# Patient Record
Sex: MALE | URBAN - METROPOLITAN AREA
[De-identification: names, ages, dates, MRNs, and addresses within clinical notes are randomized per-mention and may not be internally consistent; named-entity substitution may affect disease eponyms.]

---

## 2021-12-14 ENCOUNTER — HOSPITAL ENCOUNTER (OUTPATIENT)
Dept: OCCUPATIONAL MEDICINE | Age: 36
Discharge: HOME | End: 2021-12-14

## 2021-12-14 ENCOUNTER — TRANSCRIBE ORDER (OUTPATIENT)
Dept: OCCUPATIONAL MEDICINE | Age: 36
End: 2021-12-14

## 2021-12-14 DIAGNOSIS — Z00.8 HEALTH EXAMINATION IN POPULATION SURVEY: Primary | ICD-10-CM

## 2021-12-14 DIAGNOSIS — Z00.8 HEALTH EXAMINATION IN POPULATION SURVEY: ICD-10-CM

## 2022-06-16 ENCOUNTER — TELEPHONE (OUTPATIENT)
Dept: PULMONOLOGY | Age: 37
End: 2022-06-16

## 2022-06-16 DIAGNOSIS — J98.4 RESTRICTIVE LUNG DISEASE: Primary | ICD-10-CM

## 2022-06-16 NOTE — TELEPHONE ENCOUNTER
Called pt to remind them of their CT scan. Pt's wife answered gave her the number to radiology.  She stated she will call them and get an appointment for CT  before his office visit on 06/22/2022

## 2022-06-17 DIAGNOSIS — J98.4 RESTRICTIVE LUNG DISEASE: Primary | ICD-10-CM

## 2022-06-20 ENCOUNTER — TELEPHONE (OUTPATIENT)
Dept: PULMONOLOGY | Age: 37
End: 2022-06-20

## 2022-06-20 NOTE — TELEPHONE ENCOUNTER
Called patient to remind him he needs to get CT scan before Wednesday. Patient's wife said pt was at work and would like to   R/s for a Tuesday or Wednesday only. Patient  Is  r/s for  Wed. 07/27/2022 at 1030 am with Petey Zaragoza NP. Let her know he needs to be here 20 min before appt. Reminded her again pt has must have CT before appt. She voiced understanding. NO further Questions.

## 2022-06-28 ENCOUNTER — HOSPITAL ENCOUNTER (OUTPATIENT)
Dept: CT IMAGING | Age: 37
Discharge: HOME | End: 2022-06-30
Payer: COMMERCIAL

## 2022-06-28 DIAGNOSIS — J98.4 RESTRICTIVE LUNG DISEASE: ICD-10-CM

## 2022-06-28 PROCEDURE — 71250 CT THORAX DX C-: CPT

## 2022-07-26 NOTE — PROGRESS NOTES
[address], [facility]  [phone]        Name:  Nirmal Dixon  YOB: 1985  MRN:  [id number]    Office visit  7/27/2022      Chief Complaint   Patient presents with    New Patient     Restrictive lung disease, ILD           HISTORY OF PRESENT ILLNESS:  The patient is a 40year old male who [facility] for evaluation, treatment, and management of restrictive lung disease. He has a history of hyperlipidemia, hyperglycemia, asthma, allergic rhinitis, ANDRES, scleroderma, and chronic back pain. He is accompanied by his wife who gives much of the history. Records from [place] are reviewed and summarized--had CT of chest 4/26/21 which revealed 5 mm RUL nodule, 4 mm RLL nodule, and 2 mm RLL nodule--stable since 7/2019 and bilateral reticular opacities throughout all lobes. CPFTs done 6/16/2021 revealed severe restriction. Had HST via [facility] 6/18/2019 which revealed AHI of 74.1 and O2 sat of 67.2. Had allergy testing 7/2019 with sensitivities to ragweed, tree pollen, corn, and apples. Was under the care of a pulmonologist while living in [place], [place]. Moved here about a year ago. Initially saw a pulmonologist due to cough. Was started on Spiriva and Tessalon and cough improved. Due to persistent cough and Raynaud's phenomenon led to further work up and was diagnosed with scleroderma and ILD. Has been on Imuran 50 mg bid x 3 years. Was seeing a rheumatologist in [place], but hasn't seen anyone here yet. Cough has worsened since being out of Spiriva. Was diagnosed with asthma as a teenager which seems to be allergy driven and currently is on no therapy. Denies any wheezing. Has significant reflux and is not on any therapy. Has DHS, fatigue, snoring, and witnessed apneas per the wife. Tried CPAP for about a month in [place], but turned in back it because he couldn't tolerate the \"cold air blowing\".   He is willing Status:   Future     Number of Occurrences:   1     Standing Expiration Date:   7/27/2023    CBC with Auto Differential     Standing Status:   Future     Number of Occurrences:   1     Standing Expiration Date:   7/27/2023    Ambulatory referral to Cardiology     Referral Priority:   Routine     Referral Type:   Consult for Advice and Opinion     Referral Reason:   Specialty Services Required     Number of Visits Requested:   1    Ambulatory Referral to Sleep Studies     Referral Priority:   Routine     Referral Type:   Consult for Advice and Opinion     Referral Reason:   Specialty Services Required     Requested Specialty:   Sleep Center     Number of Visits Requested:   1    [facility] [address] Rheumatology     Referral Priority:   Routine     Referral Type:   Eval and Treat     Referral Reason:   Specialty Services Required     Requested Specialty:   Rheumatology     Number of Visits Requested:   1    Spirometry Without Bronchodilator      Orders Placed This Encounter   Medications    benzonatate (TESSALON) 100 MG capsule     Sig: Take 1-2 capsules by mouth 3 times daily as needed for Cough     Dispense:  100 capsule     Refill:  3    omeprazole (PRILOSEC) 40 MG delayed release capsule     Sig: Take 1 capsule by mouth every morning (before breakfast)     Dispense:  90 capsule     Refill:  3    tiotropium (SPIRIVA RESPIMAT) 2.5 MCG/ACT AERS inhaler     Sig: Inhale 2 puffs into the lungs in the morning. Dispense:  1 each     Refill:  11        Follow up with Dr. Chase or me in 2 months--40 minutes. Total time spent was 70 minutes. This time includes chart prep, review of tests/procedures, review of other provider's notes, documentation, and counseling patient regarding disease process and medications.             Alcira Clay NP, APRN - CNP  Electronically signed          ~~~~~~~~~~~~~~~~~~~~~~~~~~~~~~~~~~~~~~~~  REFERENCE INFORMATION    Exposure History:  Second Hand Smoke Exposure: yest  Birds: no  Asbestos: no  TB: no  Hot Tubs/Humidifier: no  Organic/Inorganic Dusts: yest  Molds: no  Occupation/Hobbies:     Past Medical History:   Diagnosis Date    Scleroderma ([address]        History reviewed. No pertinent surgical history. Family History   Problem Relation Age of Onset    Asthma Mother     Hypertension Father        Social History     Socioeconomic History    Marital status:      Spouse name: Not on file    Number of children: Not on file    Years of education: Not on file    Highest education level: Not on file   Occupational History    Not on file   Tobacco Use    Smoking status: Former     Packs/day: 0.01     Years: 1.00     Pack years: 0.01     Types: Cigarettes     Quit date:      Years since quittin.5    Smokeless tobacco: Never   Vaping Use    Vaping Use: Never used   Substance and Sexual Activity    Alcohol use: Never    Drug use: Never    Sexual activity: Not on file   Other Topics Concern    Not on file   Social History Narrative     and lives with wife. He is originally from [facility] [place] and moved to [place] in . Has lived in [place] since . Works as a .      Social Determinants of Health     Financial Resource Strain: Not on file   Food Insecurity: Not on file   Transportation Needs: Not on file   Physical Activity: Not on file   Stress: Not on file   Social Connections: Not on file   Intimate Partner Violence: Not on file   Housing Stability: Not on file       Patient Active Problem List   Diagnosis    Hyperlipidemia    GERD (gastroesophageal reflux disease)    ANDRES (obstructive sleep apnea)    ILD (interstitial lung disease) (HCC)    Scleroderma (HCC)    Lung nodule    Hypothyroidism    Chronic back pain    Allergic rhinitis    Raynaud's disease without gangrene          Allergies   Allergen Reactions    Sulfa Antibiotics Rash       Current Outpatient Medications   Medication Sig    atorvastatin (LIPITOR) 40 MG tablet     fluticasone (FLONASE) 50 MCG/ACT nasal spray     levothyroxine (SYNTHROID) 100 MCG tablet     azaTHIOprine (IMURAN) 50 MG tablet Take 50 mg by mouth in the morning and at bedtime    AMLODIPINE BESYLATE PO Take 5 mg by mouth    benzonatate (TESSALON) 100 MG capsule Take 1-2 capsules by mouth 3 times daily as needed for Cough    omeprazole (PRILOSEC) 40 MG delayed release capsule Take 1 capsule by mouth every morning (before breakfast)    tiotropium (SPIRIVA RESPIMAT) 2.5 MCG/ACT AERS inhaler Inhale 2 puffs into the lungs in the morning. No current facility-administered medications for this visit. Review of Systems   Constitutional:  Negative for chills, diaphoresis, fatigue, fever and unexpected weight change. HENT:  Negative for congestion, hearing loss, nosebleeds, postnasal drip, sinus pressure, sinus pain, sore throat and tinnitus. Eyes:  Negative for pain, redness and visual disturbance. Respiratory:  Negative for cough, shortness of breath and wheezing. Cardiovascular:  Negative for chest pain, palpitations and leg swelling. Gastrointestinal:  Negative for abdominal pain, blood in stool, constipation, diarrhea, nausea and vomiting. Endocrine: Negative for cold intolerance and heat intolerance. Genitourinary:  Negative for difficulty urinating, frequency and urgency. Musculoskeletal:  Negative for arthralgias, back pain, joint swelling, myalgias and neck pain. Skin:  Negative for rash and wound. Allergic/Immunologic: Negative for environmental allergies and food allergies. Neurological:  Negative for dizziness, tremors, seizures, syncope, weakness and headaches. Psychiatric/Behavioral:  Negative for confusion, hallucinations and suicidal ideas. The patient is not nervous/anxious.                PHYSICAL EXAM:    Vitals:    07/27/22 1032   BP: 136/89   Pulse: 66   Resp: 17   Temp: 98.8 °F (37.1 °C)   TempSrc: Temporal   SpO2: 99%  Comment: RA   Weight: 178 lb (80.7 kg)   Height: 5' 4\" (1.626 m) Body mass index is 30.55 kg/m². GENERAL APPEARANCE:  The patient is over weight and in no respiratory distress. HEENT:  PERRL. Conjunctivae unremarkable. Nasal mucosa is without epistaxis, exudate, or polyps. Gums and dentition are unremarkable. There is very severe oropharyngeal narrowing. TMs are clear. No telangiectasis noted. NECK/LYMPHATIC:  Symmetrical with no elevation of jugular venous pulsation. Trachea midline. No thyroid enlargement. No cervical adenopathy. LUNGS:  Normal respiratory effort with symmetrical lung expansion. Breath sounds with crackles in bases. HEART:  There is a regular rate and rhythm. No murmur, rub, or gallop. There is no edema in the lower extremities. ABDOMEN:  Soft and non-tender. No hepatosplenomegaly. Bowel sounds are normal.    SKIN:  There are no rashes, cyanosis, jaundice, or ecchymosis present. EXTREMITIES:  The extremities are unremarkable without clubbing, cyanosis, joint inflammation, degenerative, or ischemic change. MUSCULOSKELETAL:  There is no abnormal tone, muscle atrophy, or abnormal movement present. NEURO:  The patient is alert and oriented to person, place, and time. Memory appears intact and mood is normal.  No gross sensorimotor deficits are present. DIAGNOSTIC TESTS:   Imaging:   CT CHEST WO CONTRAST 06/28/2022    Narrative  History: Asthma, 3 years duration    EXAM: CT chest without contrast    TECHNIQUE: Thin section axial CT images are obtained from the thoracic inlet  through the upper abdomen. Radiation dose reduction techniques were used for  this study. Our CT scanners use one or all of the following: Automated exposure  control, adjustment of the mA and/or kV according to patient size, use of  iterative reconstruction. COMPARISON: 4/26/2021    FINDINGS: There is a right lower lobe pulmonary nodule present measuring 5 mm  (image 36). This is stable. The central airways are patent.  Stable interstitial  lung disease seen at the lung bases. No pleural or pericardial effusion. No new  pulmonary nodule demonstrated. The central airways are patent. No mediastinal,  hilar, or axillary lymphadenopathy. Evaluation of the upper abdomen demonstrates no definite abnormality. Bone window evaluation demonstrates no aggressive osseous lesions. Impression  5 mm right lower lobe pulmonary nodule, stable. Stable interstitial  lung disease. Spirometry/Complete pulmonary function tests:             FeNO testing:  Fractional exhaled nitric oxide testing was performed with level of 10 ppb. This is indicative of low likelihood of eosinophilic inflammation/asthma. Exercise oximetry:  O2 sat on room air at rest is 96% and resting heart rate is 98 bpm.  After walking for 4 minutes, lowest O2 sat is 96% and maximum heart rate is 104 bpm.     Labs:   No results found for: NA, K, CL, CO2, BUN, CREATININE, GLUCOSE, CALCIUM      Lab Results   Component Value Date    WBC 9.6 07/27/2022    HGB 14.6 07/27/2022    HCT 45.8 07/27/2022    MCV 96.4 07/27/2022     07/27/2022      No results found for: BNP   No results found for: TSHFT4, TSH           Izetta Situ, NP, APRN - CNP  Electronically signed    Dictated using voice recognition software.   Proof read but unrecognized errors may exist.

## 2022-07-27 ENCOUNTER — OFFICE VISIT (OUTPATIENT)
Dept: PULMONOLOGY | Age: 37
End: 2022-07-27
Payer: COMMERCIAL

## 2022-07-27 VITALS
HEART RATE: 66 BPM | HEIGHT: 64 IN | TEMPERATURE: 98.8 F | WEIGHT: 178 LBS | BODY MASS INDEX: 30.39 KG/M2 | DIASTOLIC BLOOD PRESSURE: 89 MMHG | RESPIRATION RATE: 17 BRPM | OXYGEN SATURATION: 99 % | SYSTOLIC BLOOD PRESSURE: 136 MMHG

## 2022-07-27 DIAGNOSIS — M34.9 SCLERODERMA (HCC): ICD-10-CM

## 2022-07-27 DIAGNOSIS — R05.3 CHRONIC COUGH: ICD-10-CM

## 2022-07-27 DIAGNOSIS — J45.30 MILD PERSISTENT ASTHMA WITHOUT COMPLICATION: ICD-10-CM

## 2022-07-27 DIAGNOSIS — J84.9 ILD (INTERSTITIAL LUNG DISEASE) (HCC): ICD-10-CM

## 2022-07-27 DIAGNOSIS — R91.1 LUNG NODULE: ICD-10-CM

## 2022-07-27 DIAGNOSIS — J98.4 RESTRICTIVE LUNG DISEASE: Primary | ICD-10-CM

## 2022-07-27 DIAGNOSIS — Z79.899 LONG-TERM USE OF HIGH-RISK MEDICATION: ICD-10-CM

## 2022-07-27 DIAGNOSIS — G47.33 OSA (OBSTRUCTIVE SLEEP APNEA): ICD-10-CM

## 2022-07-27 DIAGNOSIS — K21.9 GASTROESOPHAGEAL REFLUX DISEASE, UNSPECIFIED WHETHER ESOPHAGITIS PRESENT: ICD-10-CM

## 2022-07-27 PROBLEM — I73.00 RAYNAUD'S DISEASE WITHOUT GANGRENE: Status: ACTIVE | Noted: 2022-07-27

## 2022-07-27 PROBLEM — M54.9 CHRONIC BACK PAIN: Status: ACTIVE | Noted: 2022-07-27

## 2022-07-27 PROBLEM — E78.5 HYPERLIPIDEMIA: Status: ACTIVE | Noted: 2022-07-27

## 2022-07-27 PROBLEM — J30.9 ALLERGIC RHINITIS: Status: ACTIVE | Noted: 2022-07-27

## 2022-07-27 PROBLEM — E03.9 HYPOTHYROIDISM: Status: ACTIVE | Noted: 2022-07-27

## 2022-07-27 PROBLEM — G89.29 CHRONIC BACK PAIN: Status: ACTIVE | Noted: 2022-07-27

## 2022-07-27 LAB
ALBUMIN SERPL-MCNC: 3.5 G/DL (ref 3.5–5)
ALBUMIN/GLOB SERPL: 0.7 {RATIO} (ref 1.2–3.5)
ALP SERPL-CCNC: 52 U/L (ref 50–136)
ALT SERPL-CCNC: 41 U/L (ref 12–65)
ANION GAP SERPL CALC-SCNC: 5 MMOL/L (ref 7–16)
AST SERPL-CCNC: 21 U/L (ref 15–37)
BASOPHILS # BLD: 0.1 K/UL (ref 0–0.2)
BASOPHILS NFR BLD: 1 % (ref 0–2)
BILIRUB SERPL-MCNC: 0.3 MG/DL (ref 0.2–1.1)
BUN SERPL-MCNC: 15 MG/DL (ref 6–23)
CALCIUM SERPL-MCNC: 8.9 MG/DL (ref 8.3–10.4)
CHLORIDE SERPL-SCNC: 105 MMOL/L (ref 98–107)
CO2 SERPL-SCNC: 27 MMOL/L (ref 21–32)
CREAT SERPL-MCNC: 0.8 MG/DL (ref 0.8–1.5)
DIFFERENTIAL METHOD BLD: NORMAL
EOSINOPHIL # BLD: 0.5 K/UL (ref 0–0.8)
EOSINOPHIL NFR BLD: 5 % (ref 0.5–7.8)
ERYTHROCYTE [DISTWIDTH] IN BLOOD BY AUTOMATED COUNT: 12.2 % (ref 11.9–14.6)
EXPIRATORY TIME: NORMAL
FEF 25-75% %PRED-PRE: NORMAL
FEF 25-75% PRED: NORMAL
FEF 25-75%-PRE: NORMAL
FEV1 %PRED-PRE: NORMAL %
FEV1 PRED: 3.56 L
FEV1/FVC %PRED-PRE: NORMAL
FEV1/FVC PRED: NORMAL
FEV1/FVC: NORMAL %
FEV1: 1.61 L
FVC %PRED-PRE: NORMAL %
FVC PRED: 4.39 L
FVC: 1.97 L
GLOBULIN SER CALC-MCNC: 4.7 G/DL (ref 2.3–3.5)
GLUCOSE SERPL-MCNC: 104 MG/DL (ref 65–100)
HCT VFR BLD AUTO: 45.8 % (ref 41.1–50.3)
HGB BLD-MCNC: 14.6 G/DL (ref 13.6–17.2)
IMM GRANULOCYTES # BLD AUTO: 0 K/UL (ref 0–0.5)
IMM GRANULOCYTES NFR BLD AUTO: 0 % (ref 0–5)
LYMPHOCYTES # BLD: 2.6 K/UL (ref 0.5–4.6)
LYMPHOCYTES NFR BLD: 27 % (ref 13–44)
MCH RBC QN AUTO: 30.7 PG (ref 26.1–32.9)
MCHC RBC AUTO-ENTMCNC: 31.9 G/DL (ref 31.4–35)
MCV RBC AUTO: 96.4 FL (ref 79.6–97.8)
MONOCYTES # BLD: 0.7 K/UL (ref 0.1–1.3)
MONOCYTES NFR BLD: 8 % (ref 4–12)
NEUTS SEG # BLD: 5.7 K/UL (ref 1.7–8.2)
NEUTS SEG NFR BLD: 59 % (ref 43–78)
NRBC # BLD: 0 K/UL (ref 0–0.2)
NT PRO BNP: 6 PG/ML (ref 5–125)
PEF %PRED-PRE: NORMAL
PEF PRED: NORMAL
PEF-PRE: NORMAL
PLATELET # BLD AUTO: 337 K/UL (ref 150–450)
PMV BLD AUTO: 10.2 FL (ref 9.4–12.3)
POTASSIUM SERPL-SCNC: 4.4 MMOL/L (ref 3.5–5.1)
PROT SERPL-MCNC: 8.2 G/DL (ref 6.3–8.2)
RBC # BLD AUTO: 4.75 M/UL (ref 4.23–5.6)
SODIUM SERPL-SCNC: 137 MMOL/L (ref 138–145)
WBC # BLD AUTO: 9.6 K/UL (ref 4.3–11.1)

## 2022-07-27 PROCEDURE — 99205 OFFICE O/P NEW HI 60 MIN: CPT | Performed by: NURSE PRACTITIONER

## 2022-07-27 RX ORDER — OMEPRAZOLE 40 MG/1
40 CAPSULE, DELAYED RELEASE ORAL
Qty: 90 CAPSULE | Refills: 3 | Status: SHIPPED | OUTPATIENT
Start: 2022-07-27

## 2022-07-27 RX ORDER — BENZONATATE 100 MG/1
100 CAPSULE ORAL 3 TIMES DAILY PRN
COMMUNITY
End: 2022-07-27

## 2022-07-27 RX ORDER — FLUTICASONE PROPIONATE 50 MCG
SPRAY, SUSPENSION (ML) NASAL
COMMUNITY
Start: 2022-04-27

## 2022-07-27 RX ORDER — AZATHIOPRINE 50 MG/1
50 TABLET ORAL 2 TIMES DAILY
COMMUNITY

## 2022-07-27 RX ORDER — ATORVASTATIN CALCIUM 40 MG/1
TABLET, FILM COATED ORAL
COMMUNITY
Start: 2022-04-27

## 2022-07-27 RX ORDER — LEVOTHYROXINE SODIUM 0.1 MG/1
TABLET ORAL
COMMUNITY
Start: 2022-05-25

## 2022-07-27 RX ORDER — BENZONATATE 100 MG/1
100-200 CAPSULE ORAL 3 TIMES DAILY PRN
Qty: 100 CAPSULE | Refills: 3 | Status: SHIPPED | OUTPATIENT
Start: 2022-07-27

## 2022-07-27 ASSESSMENT — ENCOUNTER SYMPTOMS
SHORTNESS OF BREATH: 0
SINUS PRESSURE: 0
NAUSEA: 0
DIARRHEA: 0
COUGH: 0
CONSTIPATION: 0
WHEEZING: 0
BACK PAIN: 0
ABDOMINAL PAIN: 0
SORE THROAT: 0
BLOOD IN STOOL: 0
SINUS PAIN: 0
EYE REDNESS: 0
VOMITING: 0
EYE PAIN: 0

## 2022-07-27 ASSESSMENT — PULMONARY FUNCTION TESTS
FEV1_PREDICTED: 3.56
FVC: 1.97
FEV1: 1.61
FVC_PREDICTED: 4.39

## 2022-07-27 NOTE — PATIENT INSTRUCTIONS
Tessalon Perles--1-2 capsules 3 times daily as needed for cough    Spiriva, 2 puffs once daily.     I've ordered echo, home sleep study

## 2022-07-28 LAB
CENTROMERE B AB SER-ACNC: <0.2 AI (ref 0–0.9)
ENA SS-A AB SER-ACNC: <0.2 AI (ref 0–0.9)
ENA SS-B AB SER-ACNC: <0.2 AI (ref 0–0.9)

## 2022-07-28 PROCEDURE — 94010 BREATHING CAPACITY TEST: CPT | Performed by: NURSE PRACTITIONER

## 2022-09-20 ENCOUNTER — TELEPHONE (OUTPATIENT)
Dept: PULMONOLOGY | Age: 37
End: 2022-09-20

## 2022-12-06 NOTE — PROGRESS NOTES
Name:  Vicenta Sousa  YOB: 1985   MRN: [id number]      Office Visit: 12/7/2022        ASSESSMENT AND PLAN:  (Medical Decision Making)      Stefania Salamanca was seen today for follow-up from hospital.    Diagnoses and all orders for this visit:    Scleroderma ([address]  --remains on Imuran. Will place urgent referral to rheumatology to get their input regarding use of Imuran during immunotherapy. He also needs someone to manage his scleroderma locally. He is continuing to get refills from his rheumatologist in [address] [place] [address] Rheumatology    ILD (interstitial lung disease) [facility])  --secondary to above. Mild persistent asthma without complication  --continue Spiriva. Controlled. Metastatic malignant neoplasm, unspecified site [facility])  --unknown primary per [facility] oncology. Has bone and skin mets as well. Follow-up and Dispositions    Return in about 4 months (around 4/7/2023) for any MD--40 minutes--needs . Collaborating physician is Dr. Frankey Easterly. ADS    Bud Lee NP, APRN - CNP    Total time for encounter on day of encounter was 58 minutes. This time includes chart prep, review of tests/procedures, review of other provider's notes, documentation and counseling patient regarding disease process and medications. _________________________________________________________________________    HISTORY OF PRESENT ILLNESS:    Mr. Vicenta Sousa is a 40 y.o. male who is seen at [address] [place], [place] [id number] today for  Follow-Up from Hospital     The patient is a 40year old male who is seen for hospital follow up. He is accompanied by his wife and audio  services are used throughout the visit. Hospital records from [facility] are reviewed. He was hospitalized twice in November. Initially he presented to the ER with cough and fever on 10/31/22 and discharged on 11/7/22. Temp max was 102.   CT of chest revealed 5.6 x 6 cm RLL mass as well as right hilar and subcarinal adenopathy. Had bronchoscopy, EBUS, and FNA done on 11/4/22. Bronchial washings negative for malignancy, cultures negative, level 7 lymph node positive for metastatic poorly differentiated carcinoma,  4R lymph node negative, 11R positive for metastatic poorly differentiated malignant neoplasm. Had FNA of abdominal wall which was also positive for poorly differentiated malignant neoplasm, negative flow cytometry. Follow up CT of chest done 11/22/22 revealed RLL, hilar, mediastinal masses. Multiple metastatic tumors are seen in the subcutaneous tissues of the chest, abdomen, pelvis, intramuscular tumor noted in multiple regions, as well as bilateral inferior pubic ramilytic tumor. Near circumferential tumor narrows the RML and RLL bronchus. Has had negative CT of head. Bone scan revealed bone mets. Has seen oncology at [facility], Dr. Dennie Repress. Diagnosis is cancer of unknown primary, favor lung, stage 4. Is to start chemo soon. The questions that is posed to us is regarding immunosuppression. He will be getting possible immunotherapy and will this flare up his ILD/scleroderma. He has underlying history of lung nodule, hyperlipidemia, hyperglycemia, asthma, allergic rhinitis, ANDRES, scleroderma, and chronic back pain. He moved here from [place] in 2021. Was under the care of a pulmonologist in [place]. Initially was seen due to cough. Due to persistent cough and Raynaud's phenomenon led to further work up and was diagnosed with scleroderma and ILD. Has been on Imuran 50 mg bid x for nearly 4 years. Was seeing a rheumatologist in [place], but hasn't seen anyone here yet. Was diagnosed with asthma as a teenager which seems to be allergy driven. Remains on Spiriva. REVIEW OF SYSTEMS: 10 point review of systems is negative except as reported in HPI. PHYSICAL EXAM: Body mass index is 26.09 kg/m².   Vitals:    12/07/22 0814   BP: 102/66   Pulse: (!) 103   Resp: 17   Temp: 96.9 °F (36.1 °C)   TempSrc: Skin   SpO2: 100%   Weight: 152 lb (68.9 kg)   Height: 5' 4\" (1.626 m)         General:   Alert, cooperative, no distress, appears stated age. Eyes:   Conjunctivae/corneas clear. PERRL        Mouth/Throat:  Lips, mucosa, and tongue normal. Teeth and gums normal.        Lungs:     Breath sounds are minimally decreased bilaterally with dry crackles in bases. Heart:   Regular rate and rhythm, S1, S2 normal, no murmur, click, rub or gallop. Abdomen:    Soft, non-tender. Extremities:  Extremities normal, atraumatic, no cyanosis or edema. Skin:  Skin color normal. No rashes or lesions     Neurologic:  A&Ox3     DIAGNOSTIC TESTS:                                                                                    LABS:   Lab Results   Component Value Date/Time    WBC 9.6 07/27/2022 11:38 AM    HGB 14.6 07/27/2022 11:38 AM    HCT 45.8 07/27/2022 11:38 AM     07/27/2022 11:38 AM    NTPROBNP 6 07/27/2022 11:38 AM     Imaging: I performed an independent interpretation of the patient's images. CXR:   XR CHEST LIMITED ONE VIEW 12/14/2021    Narrative  Exam: XR CHEST SNGL V on 12/14/2021 3:18 PM    Clinical History: The Male patient is 39years old  presenting for To rule out  active TB. Comparison:  none    Findings:  Frontal view of the chest was obtained. The lung fields are clear. No pleural effusions are demonstrated. The  cardiomediastinal silhouette is within normal limits. There are no acute  osseous abnormalities. Impression  1. No plain film evidence of pulmonary tuberculosis. CPT code(s) 70384    CT Chest:   CT CHEST WO CONTRAST 06/28/2022    Narrative  History: Asthma, 3 years duration    EXAM: CT chest without contrast    TECHNIQUE: Thin section axial CT images are obtained from the thoracic inlet  through the upper abdomen. Radiation dose reduction techniques were used for  this study.   Our CT scanners use one or all of the following: Automated exposure  control, adjustment of the mA and/or kV according to patient size, use of  iterative reconstruction. COMPARISON: 2021    FINDINGS: There is a right lower lobe pulmonary nodule present measuring 5 mm  (image 36). This is stable. The central airways are patent. Stable interstitial  lung disease seen at the lung bases. No pleural or pericardial effusion. No new  pulmonary nodule demonstrated. The central airways are patent. No mediastinal,  hilar, or axillary lymphadenopathy. Evaluation of the upper abdomen demonstrates no definite abnormality. Bone window evaluation demonstrates no aggressive osseous lesions. Impression  5 mm right lower lobe pulmonary nodule, stable. Stable interstitial  lung disease. 22 at [place]--                              Nuclear Medicine: No results found for this or any previous visit from the past 3650 days. PFTs:   Office Spirometry Results Latest Ref Rng & Units 2022   FVC L 1.97   FEV1 L 1.61   FEV1 %PRED-PRE % 45%   FVC %PRED-PRE % 45%   FEV1/FVC % 82%       [facility] Reference Info:                                                                                                                  Past Medical History:   Diagnosis Date    Scler[address]a ([facility] [address]         Tobacco Use      Smoking status: Former        Packs/day: 0.01        Years: 1.00        Pack years: .01        Types: Cigarettes        Quit date:         Years since quittin.9      Smokeless tobacco: Never    Allergies   Allergen Reactions    Sulfa Antibiotics Rash     Current Outpatient Medications   Medication Instructions    AMLODIPINE BESYLATE PO 5 mg    atorvastatin (LIPITOR) 40 MG tablet No dose, route, or frequency recorded. azaTHIOprine (IMURAN) 50 mg, 2 times daily    benzonatate (TESSALON) 100-200 mg, Oral, 3 TIMES DAILY PRN    dexamethasone (DECADRON) 4 MG tablet Do not start before 2022. Take 5 tablets (20 mg) the evening before treatment.     fluticasone (FLONASE) 50 MCG/ACT nasal spray No dose, route, or frequency recorded. folic acid (FOLVITE) 5 mg, Oral, DAILY    ibuprofen (ADVIL;MOTRIN) 400 MG tablet No dose, route, or frequency recorded. levoFLOXacin (LEVAQUIN) 500 MG tablet Do not start before December 7, 2022. Take one tablet (500 mg) by mouth daily as needed for fever. Call with temperature greater than 100.5. Only start if instructed. levothyroxine (SYNTHROID) 100 MCG tablet No dose, route, or frequency recorded. metoprolol succinate (TOPROL XL) 50 mg, Oral, DAILY    omeprazole (PRILOSEC) 40 mg, Oral, DAILY BEFORE BREAKFAST    ondansetron (ZOFRAN) 8 mg, Oral, 3 TIMES DAILY PRN    oxyCODONE HCl (OXY-IR) 10-20 mg, Oral, EVERY 6 HOURS PRN    promethazine (PHENERGAN) 25 MG tablet Do not start before December 7, 2022.  Take 0.5-1 tablet by mouth every 4 hours PRN nausea    tiotropium (SPIRIVA RESPIMAT) 2.5 MCG/ACT AERS inhaler 2 puffs, Inhalation, DAILY

## 2022-12-07 ENCOUNTER — OFFICE VISIT (OUTPATIENT)
Dept: PULMONOLOGY | Age: 37
End: 2022-12-07
Payer: COMMERCIAL

## 2022-12-07 ENCOUNTER — CLINICAL DOCUMENTATION (OUTPATIENT)
Dept: PULMONOLOGY | Age: 37
End: 2022-12-07

## 2022-12-07 ENCOUNTER — TELEPHONE (OUTPATIENT)
Dept: PULMONOLOGY | Age: 37
End: 2022-12-07

## 2022-12-07 VITALS
HEART RATE: 103 BPM | HEIGHT: 64 IN | OXYGEN SATURATION: 100 % | DIASTOLIC BLOOD PRESSURE: 66 MMHG | TEMPERATURE: 96.9 F | RESPIRATION RATE: 17 BRPM | SYSTOLIC BLOOD PRESSURE: 102 MMHG | BODY MASS INDEX: 25.95 KG/M2 | WEIGHT: 152 LBS

## 2022-12-07 DIAGNOSIS — J45.30 MILD PERSISTENT ASTHMA WITHOUT COMPLICATION: ICD-10-CM

## 2022-12-07 DIAGNOSIS — M34.9 SCLERODERMA (HCC): Primary | ICD-10-CM

## 2022-12-07 DIAGNOSIS — C79.9 METASTATIC MALIGNANT NEOPLASM, UNSPECIFIED SITE (HCC): ICD-10-CM

## 2022-12-07 DIAGNOSIS — J84.9 ILD (INTERSTITIAL LUNG DISEASE) (HCC): ICD-10-CM

## 2022-12-07 PROCEDURE — 99215 OFFICE O/P EST HI 40 MIN: CPT | Performed by: NURSE PRACTITIONER

## 2022-12-07 RX ORDER — PROMETHAZINE HYDROCHLORIDE 25 MG/1
TABLET ORAL
COMMUNITY
Start: 2022-12-07

## 2022-12-07 RX ORDER — METOPROLOL SUCCINATE 50 MG/1
50 TABLET, EXTENDED RELEASE ORAL DAILY
COMMUNITY
Start: 2022-11-23 | End: 2022-12-23

## 2022-12-07 RX ORDER — DEXAMETHASONE 4 MG/1
TABLET ORAL
COMMUNITY
Start: 2022-11-29

## 2022-12-07 RX ORDER — IBUPROFEN 400 MG/1
TABLET ORAL
COMMUNITY
Start: 2022-09-13

## 2022-12-07 RX ORDER — LEVOFLOXACIN 500 MG/1
TABLET, FILM COATED ORAL
COMMUNITY
Start: 2022-12-07

## 2022-12-07 RX ORDER — ONDANSETRON HYDROCHLORIDE 8 MG/1
8 TABLET, FILM COATED ORAL 3 TIMES DAILY PRN
COMMUNITY
Start: 2022-12-07

## 2022-12-07 RX ORDER — OXYCODONE HYDROCHLORIDE 10 MG/1
10-20 TABLET ORAL EVERY 6 HOURS PRN
COMMUNITY
Start: 2022-12-01

## 2022-12-07 RX ORDER — FOLIC ACID 1 MG/1
5 TABLET ORAL DAILY
COMMUNITY
Start: 2022-11-24 | End: 2022-12-24

## 2022-12-07 NOTE — PROGRESS NOTES
Received message back from [address] [place] [address] rheumatology and they have initially declined to see patient urgently due to negative NAM. I called office today to speak with a provider, but no providers in office today. We will cancel referral to them and refer to [place] rheumatology.

## 2022-12-07 NOTE — TELEPHONE ENCOUNTER
I called [facility] rheumatology to try to get patient an appointment. Was informed that they had no appointments until June. I asked to speak with physician and none was available. Was given name and number of on call physician of Dr. Jaycee Haas at [phone], but did not receive answer when I called that number.

## 2022-12-07 NOTE — TELEPHONE ENCOUNTER
I have put in a call to [address] [place] [address]. Discussed case with . She will review with physicians and get back with us regarding appointment.

## 2022-12-07 NOTE — TELEPHONE ENCOUNTER
Reviewed case with Dr. Mayte Kyle at [address] [place] [address]. Recommendation is to stop Imuran for now and treat the cancer. They are more than willing to see patient, but feel that it is in patient's best interest to see someone within same EMR system at [facility]. Would also recommend getting records from his rheumatologist in [place] to see where the patient's scleroderma was manifested. Also recommended that if he needs more help, could see Dr. Julianna Amin at [facility] [address]. Please cancel urgent referral and make routine referral to [facility] rheumatology. Thank you.